# Patient Record
Sex: FEMALE | Race: WHITE | NOT HISPANIC OR LATINO | Employment: FULL TIME | URBAN - METROPOLITAN AREA
[De-identification: names, ages, dates, MRNs, and addresses within clinical notes are randomized per-mention and may not be internally consistent; named-entity substitution may affect disease eponyms.]

---

## 2019-01-08 RX ORDER — CETIRIZINE HYDROCHLORIDE 10 MG/1
10 TABLET ORAL DAILY
COMMUNITY

## 2019-01-08 RX ORDER — IBUPROFEN 800 MG/1
800 TABLET ORAL EVERY 6 HOURS PRN
COMMUNITY
End: 2021-06-01

## 2019-01-08 RX ORDER — ALBUTEROL SULFATE 90 UG/1
2 AEROSOL, METERED RESPIRATORY (INHALATION) EVERY 4 HOURS PRN
COMMUNITY

## 2019-01-08 RX ORDER — ONDANSETRON 4 MG/1
4 TABLET, FILM COATED ORAL EVERY 8 HOURS PRN
COMMUNITY
End: 2020-02-27

## 2019-01-09 ENCOUNTER — OFFICE VISIT (OUTPATIENT)
Dept: NEUROSURGERY | Facility: CLINIC | Age: 44
End: 2019-01-09

## 2019-01-09 VITALS
DIASTOLIC BLOOD PRESSURE: 70 MMHG | HEIGHT: 64 IN | SYSTOLIC BLOOD PRESSURE: 110 MMHG | WEIGHT: 168.8 LBS | BODY MASS INDEX: 28.82 KG/M2 | TEMPERATURE: 97.6 F

## 2019-01-09 DIAGNOSIS — M54.6 PAIN IN THORACIC SPINE: Primary | ICD-10-CM

## 2019-01-09 DIAGNOSIS — R10.13 EPIGASTRIC PAIN: ICD-10-CM

## 2019-01-09 PROCEDURE — 99243 OFF/OP CNSLTJ NEW/EST LOW 30: CPT | Performed by: NEUROLOGICAL SURGERY

## 2019-01-09 RX ORDER — CYCLOBENZAPRINE HCL 10 MG
10 TABLET ORAL 3 TIMES DAILY PRN
COMMUNITY
End: 2021-06-01

## 2019-01-09 RX ORDER — AMITRIPTYLINE HYDROCHLORIDE 25 MG/1
25 TABLET, FILM COATED ORAL NIGHTLY
COMMUNITY
End: 2020-02-27

## 2019-01-09 NOTE — PROGRESS NOTES
Subjective     Chief Complaint: Left-sided thoracic back pain, left chest wall pain, epigastric pain with nausea.    Patient ID: Kamille Beltrán is a 43 y.o. female seen for consultation today at the request of  Tank Anand,*    Back Pain   This is a chronic problem. The current episode started more than 1 year ago. The problem occurs constantly. The pain is present in the thoracic spine. The quality of the pain is described as aching. The pain is at a severity of 8/10. The pain is severe. The pain is the same all the time. The symptoms are aggravated by twisting and sitting. Stiffness is present all day. Associated symptoms include numbness. Pertinent negatives include no abdominal pain, chest pain, dysuria, fever, headaches or weakness. Risk factors include lack of exercise and sedentary lifestyle. She has tried analgesics, chiropractic manipulation, home exercises and NSAIDs for the symptoms. The treatment provided no relief.       This is a 43-year-old woman who presents to my office with a multiyear history of left-sided back pain, left chest wall pain, and epigastric pain with nausea.  She has a history of a remote lumbar spine operation which was performed 22 years ago at [facility].  She has tried physical therapy and chiropractic care which are ineffective at alleviating her pain.  She denies any other neurological symptoms, specifically no gait instability, or bladder/bowel incontinence.  She denies antecedent trauma.    The following portions of the patient's history were reviewed and updated as appropriate: allergies, current medications, past family history, past medical history, past social history, past surgical history and problem list.    Family history:   Family History   Problem Relation Age of Onset   • Cancer Father        Social history:   Social History     Socioeconomic History   • Marital status:      Spouse name: Not on file   • Number of children: Not on file   • Years  of education: Not on file   • Highest education level: Not on file   Social Needs   • Financial resource strain: Not on file   • Food insecurity - worry: Not on file   • Food insecurity - inability: Not on file   • Transportation needs - medical: Not on file   • Transportation needs - non-medical: Not on file   Occupational History   • Not on file   Tobacco Use   • Smoking status: Current Every Day Smoker     Packs/day: 1.00   • Smokeless tobacco: Never Used   Substance and Sexual Activity   • Alcohol use: Yes   • Drug use: No   • Sexual activity: Defer   Other Topics Concern   • Not on file   Social History Narrative   • Not on file       Review of Systems   Constitutional: Positive for activity change, fatigue and unexpected weight change. Negative for appetite change, chills, diaphoresis and fever.   HENT: Positive for congestion. Negative for dental problem, drooling, ear discharge, ear pain, facial swelling, hearing loss, mouth sores, nosebleeds, postnasal drip, rhinorrhea, sinus pressure, sneezing, sore throat, tinnitus, trouble swallowing and voice change.    Eyes: Positive for visual disturbance. Negative for photophobia, pain, discharge, redness and itching.   Respiratory: Negative for apnea, cough, choking, chest tightness, shortness of breath, wheezing and stridor.    Cardiovascular: Negative for chest pain, palpitations and leg swelling.   Gastrointestinal: Negative for abdominal distention, abdominal pain, anal bleeding, blood in stool, constipation, diarrhea, nausea, rectal pain and vomiting.   Endocrine: Negative for cold intolerance, heat intolerance, polydipsia, polyphagia and polyuria.   Genitourinary: Negative for decreased urine volume, difficulty urinating, dysuria, enuresis, flank pain, frequency, genital sores, hematuria and urgency.   Musculoskeletal: Positive for back pain. Negative for arthralgias, gait problem, joint swelling, myalgias, neck pain and neck stiffness.   Skin: Negative for  "color change, pallor, rash and wound.   Allergic/Immunologic: Negative for environmental allergies, food allergies and immunocompromised state.   Neurological: Positive for numbness. Negative for dizziness, tremors, seizures, syncope, facial asymmetry, speech difficulty, weakness, light-headedness and headaches.   Hematological: Negative for adenopathy. Does not bruise/bleed easily.   Psychiatric/Behavioral: Negative for agitation, behavioral problems, confusion, decreased concentration, dysphoric mood, hallucinations, self-injury, sleep disturbance and suicidal ideas. The patient is nervous/anxious. The patient is not hyperactive.    All other systems reviewed and are negative.      Objective   Blood pressure 110/70, temperature 97.6 °F (36.4 °C), temperature source Temporal, height 162.6 cm (64\"), weight 76.6 kg (168 lb 12.8 oz).  Body mass index is 28.97 kg/m².    Physical Exam   Constitutional: She is oriented to person, place, and time. She appears well-developed and well-nourished.  Non-toxic appearance. No distress.   HENT:   Head: Normocephalic and atraumatic.   Right Ear: Hearing normal.   Left Ear: Hearing normal.   Nose: Nose normal.   Mouth/Throat: Oropharynx is clear and moist.   Eyes: Conjunctivae, EOM and lids are normal. Pupils are equal, round, and reactive to light. Right eye exhibits no discharge. Left eye exhibits no discharge.   Neck: Normal range of motion and phonation normal. No JVD present. No tracheal deviation present. No thyromegaly present.   Cardiovascular: Normal rate and regular rhythm.   Pulses:       Radial pulses are 2+ on the right side, and 2+ on the left side.   Pulmonary/Chest: Effort normal. No stridor. No respiratory distress. She has no wheezes.   Abdominal: Soft. Normal appearance. She exhibits no distension. There is no tenderness.   Musculoskeletal: She exhibits no edema.        Thoracic back: She exhibits decreased range of motion and tenderness.   Muscle Group     L     "      R  Deltoid                5          5  Bicep                  5          5  Tricep                 5          5                       5          5  Hand IO              5          5  Hip Flexor           5          5  Knee Extensor   5          5     ADF                    5          5  APF                    5          5      Neurological: She is alert and oriented to person, place, and time. She displays normal reflexes. No cranial nerve deficit or sensory deficit. She exhibits normal muscle tone. She displays a negative Romberg sign. Coordination and gait normal. GCS eye subscore is 4. GCS verbal subscore is 5. GCS motor subscore is 6.   Reflex Scores:       Patellar reflexes are 3+ on the right side and 3+ on the left side.       Achilles reflexes are 3+ on the right side and 3+ on the left side.  No clonus   Skin: Skin is warm and dry. No rash noted. She is not diaphoretic. No erythema.   Psychiatric: She has a normal mood and affect. Her speech is normal and behavior is normal. Judgment and thought content normal.   Nursing note and vitals reviewed.        Assessment/Plan     Independent Review of Radiographic Studies:      Available for my review is a MRI of the thoracic spine which was performed on December 28, 2018.  This demonstrates a small, paracentral disc herniation in the mid thoracic spine, at approximately the T7-8 or T8-9 level.  There is no evidence of significant central canal stenosis or spinal cord impingement.  His CT scan from 7/31/18 is also reviewed.  This demonstrates mild degenerative changes which are diffuse in nature, throughout the thoracic spine.    Medical Decision Making:      This is a 43-year-old woman with a multiyear history of epigastric pain, and left-sided thoracic back pain.  She does not have any radiographic evidence of thoracic nerve root compression.  My recommendation is for physical therapy and pain management for her thoracic back pain.  She might benefit  from a left sided intraforaminal injection in the T7 8 or T8 9.  This could be a useful diagnostic tool to determine if she might benefit from a foraminotomy, however really don't see anything surgical on her MRI.    She has a history of epigastric pain and nausea, so I made a referral to gastroenterology.  She can follow-up with me on an as-needed basis for progressive or refractory symptoms.    Kamille was seen today for back pain.    Diagnoses and all orders for this visit:    Pain in thoracic spine  -     Ambulatory Referral to Pain Management  -     Ambulatory Referral to Physical Therapy Evaluate and treat    Epigastric pain  -     Ambulatory Referral to Gastroenterology        Return if symptoms worsen or fail to improve.           This document signed by SPRING Draper MD January 9, 2019 9:53 AM

## 2019-01-14 ENCOUNTER — TELEPHONE (OUTPATIENT)
Dept: NEUROSURGERY | Facility: CLINIC | Age: 44
End: 2019-01-14

## 2019-01-14 NOTE — TELEPHONE ENCOUNTER
Patient calling was currently at her physical therapy location call was sent directly to me.  Patient needed her physical therapy order faxed to [phone].    Order faxed, encounter closed.

## 2019-02-14 ENCOUNTER — OFFICE VISIT (OUTPATIENT)
Dept: GASTROENTEROLOGY | Facility: CLINIC | Age: 44
End: 2019-02-14

## 2019-02-14 DIAGNOSIS — R10.13 EPIGASTRIC PAIN: Primary | ICD-10-CM

## 2019-02-14 DIAGNOSIS — K21.9 GASTROESOPHAGEAL REFLUX DISEASE WITHOUT ESOPHAGITIS: ICD-10-CM

## 2019-02-14 PROCEDURE — 99204 OFFICE O/P NEW MOD 45 MIN: CPT | Performed by: INTERNAL MEDICINE

## 2019-02-14 NOTE — PROGRESS NOTES
PCP:  Tank Anand MD Newman, Charles Benjamin, MD  [address] [facility]  [address]  [place] [address]    Chief Complaint   Patient presents with   • Abdominal Pain   • Nausea        HPI   The patient comes in with pain.  The pain was in her chest.  She felt like this was musculoskeletal or related to her back and neck.  She apparently had some epigastric pain and nausea as well.  The pain came and went.  Doesn't appear to be related to meals.  She does have some reflux but that isn't a major concern for her.  She has no dysphagia.  When she had the pain was 8/10 in severity.  She was sent for evaluation given her epigastric pain and nausea.   She was referred to evaluate for potential GI source of the pain.    Allergies: No known drug allergies      Current Outpatient Medications:   •  albuterol sulfate HFA (VENTOLIN HFA) 108 (90 Base) MCG/ACT inhaler, Inhale 2 puffs Every 4 (Four) Hours As Needed for Wheezing., Disp: , Rfl:   •  amitriptyline (ELAVIL) 25 MG tablet, Take 25 mg by mouth Every Night., Disp: , Rfl:   •  cetirizine (zyrTEC) 10 MG tablet, Take 10 mg by mouth Daily., Disp: , Rfl:   •  cyclobenzaprine (FLEXERIL) 10 MG tablet, Take 10 mg by mouth 3 (Three) Times a Day As Needed for Muscle Spasms., Disp: , Rfl:   •  Fluticasone Furoate 50 MCG/ACT aerosol powder , Inhale., Disp: , Rfl:   •  ibuprofen (ADVIL,MOTRIN) 800 MG tablet, Take 800 mg by mouth Every 6 (Six) Hours As Needed for Mild Pain ., Disp: , Rfl:   •  ondansetron (ZOFRAN) 4 MG tablet, Take 4 mg by mouth Every 8 (Eight) Hours As Needed for Nausea or Vomiting., Disp: , Rfl:      Past Medical History:   Diagnosis Date   • Back problem        Past Surgical History:   Procedure Laterality Date   • LUMBAR SPINE SURGERY  1997    [facility]   • TUBAL ABDOMINAL LIGATION          Social History     Socioeconomic History   • Marital status:      Spouse name: Not on file   • Number of children: Not on file   • Years of  education: Not on file   • Highest education level: Not on file   Social Needs   • Financial resource strain: Not on file   • Food insecurity - worry: Not on file   • Food insecurity - inability: Not on file   • Transportation needs - medical: Not on file   • Transportation needs - non-medical: Not on file   Occupational History   • Not on file   Tobacco Use   • Smoking status: Current Every Day Smoker     Packs/day: 1.00   • Smokeless tobacco: Never Used   Substance and Sexual Activity   • Alcohol use: No     Frequency: Never   • Drug use: No   • Sexual activity: Defer   Other Topics Concern   • Not on file   Social History Narrative   • Not on file        Family History   Problem Relation Age of Onset   • Cancer Father         Review of Systems   Constitutional: Negative for unexpected weight loss.   HENT: Negative for trouble swallowing.    Eyes: Negative.    Respiratory: Negative.    Gastrointestinal: Positive for abdominal pain, nausea, GERD and indigestion. Negative for abdominal distention, anal bleeding, blood in stool, constipation, diarrhea, rectal pain and vomiting.   Endocrine: Negative.    Genitourinary: Negative.    Musculoskeletal: Negative.    Skin: Negative.    Allergic/Immunologic: Negative.    Neurological: Negative.    Hematological: Negative.    Psychiatric/Behavioral: Negative.         There were no vitals filed for this visit.     Physical Exam   General Appearance: Alert, in no acute distress   Head: Normocephalic, without obvious abnormality, atraumatic   Eyes: Lids and lashes normal, conjunctivae and sclerae normal, no icterus, no pallor, corneas clear, PERRLA   Ears: Ears appear intact with no abnormalities noted   Throat: No oral lesions, no thrush, oral mucosa moist   Neck: No adenopathy, supple, trachea midline, no thyromegaly, no JVD   Lungs: Clear to auscultation,respirations regular, even and unlabored Heart: Regular rhythm and normal rate, normal S1 and S2, no murmur, no gallop, no  rub, no click   Chest Wall: Symmetrical respiratory expansion   Abdomen: Normal bowel sounds, no masses, no organomegaly, soft non-tender, non-distended, no guarding, no rebound tenderness   Extremities: Moves all extremities well, no edema, no cyanosis, no redness   Skin: No bleeding, bruising or rash   Neurologic: Cranial nerves 2 - 12 grossly intact, no focal deficits       Kamille was seen today for abdominal pain and nausea.    Diagnoses and all orders for this visit:    Epigastric pain    Gastroesophageal reflux disease without esophagitis    Other orders  -     omeprazole (priLOSEC) 40 MG capsule; Take 1 capsule by mouth Daily.    Impressions and plan #1 epigastric pain and reflux.  We talked about the options.  Typically, I perform a upper endoscopy to evaluate.  I think its reasonable to consider the Prilosec 40 mg a day for the next 6-8 weeks to see how she does.  If she has continue troubles that I would consider endoscopy.  She has had a colonoscopy on 12/7/17.  This was remarkable for 2 small sessile polyps.  These were tubular adenomas.  A 5 year follow-up was suggested.  See her back in follow-up in 2 months.     Dariel Basilio MD

## 2019-02-15 PROBLEM — R10.13 EPIGASTRIC PAIN: Status: ACTIVE | Noted: 2019-02-15

## 2019-02-15 RX ORDER — OMEPRAZOLE 40 MG/1
40 CAPSULE, DELAYED RELEASE ORAL DAILY
Qty: 90 CAPSULE | Refills: 3 | Status: SHIPPED | OUTPATIENT
Start: 2019-02-15 | End: 2020-02-27

## 2020-02-27 ENCOUNTER — OFFICE VISIT (OUTPATIENT)
Dept: RETAIL CLINIC | Facility: CLINIC | Age: 45
End: 2020-02-27

## 2020-02-27 VITALS
WEIGHT: 173.6 LBS | DIASTOLIC BLOOD PRESSURE: 64 MMHG | HEART RATE: 97 BPM | RESPIRATION RATE: 18 BRPM | SYSTOLIC BLOOD PRESSURE: 116 MMHG | TEMPERATURE: 97.8 F | BODY MASS INDEX: 29.64 KG/M2 | HEIGHT: 64 IN | OXYGEN SATURATION: 98 %

## 2020-02-27 DIAGNOSIS — R06.2 WHEEZES: ICD-10-CM

## 2020-02-27 DIAGNOSIS — J45.40 MODERATE PERSISTENT ASTHMA, UNSPECIFIED WHETHER COMPLICATED: ICD-10-CM

## 2020-02-27 DIAGNOSIS — Z72.0 TOBACCO ABUSE: ICD-10-CM

## 2020-02-27 DIAGNOSIS — J06.9 ACUTE URI: Primary | ICD-10-CM

## 2020-02-27 PROCEDURE — 99213 OFFICE O/P EST LOW 20 MIN: CPT | Performed by: NURSE PRACTITIONER

## 2020-02-27 RX ORDER — ALBUTEROL SULFATE 1.25 MG/3ML
1 SOLUTION RESPIRATORY (INHALATION) EVERY 6 HOURS PRN
Qty: 30 VIAL | Refills: 0 | Status: SHIPPED | OUTPATIENT
Start: 2020-02-27 | End: 2020-03-28

## 2020-02-27 RX ORDER — DOXYCYCLINE HYCLATE 100 MG
100 TABLET ORAL 2 TIMES DAILY
Qty: 20 TABLET | Refills: 0 | Status: SHIPPED | OUTPATIENT
Start: 2020-02-27 | End: 2020-03-08

## 2020-02-27 RX ORDER — ALBUTEROL SULFATE 90 UG/1
2 AEROSOL, METERED RESPIRATORY (INHALATION) EVERY 4 HOURS PRN
Qty: 1 INHALER | Refills: 0 | Status: SHIPPED | OUTPATIENT
Start: 2020-02-27 | End: 2020-03-28

## 2020-02-27 RX ORDER — PREDNISONE 20 MG/1
20 TABLET ORAL DAILY
Qty: 5 TABLET | Refills: 0 | Status: SHIPPED | OUTPATIENT
Start: 2020-02-27 | End: 2020-03-03

## 2020-02-27 NOTE — PROGRESS NOTES
Subjective   Kamille Beltrán is a 44 y.o. female.     Congestion and cough. Today with right sided face pain and ear pain, knot in right side of neck.  Using zyrtec, flonase and singulair.         The following portions of the patient's history were reviewed and updated as appropriate: allergies, current medications, past family history, past medical history, past social history, past surgical history and problem list.    Review of Systems   Constitutional: Positive for appetite change, chills and fatigue. Negative for fever.   HENT: Positive for congestion, ear pain, sinus pain and sore throat.    Respiratory: Positive for cough and wheezing.        Objective   Physical Exam   HENT:   Head: Normocephalic.   Right Ear: Tympanic membrane normal.   Left Ear: Tympanic membrane normal.   Nose: Mucosal edema present.   Mouth/Throat: Posterior oropharyngeal erythema present. Tonsils are 0 on the right. Tonsils are 0 on the left.   Eyes: Pupils are equal, round, and reactive to light.   Neck: Normal range of motion.   Cardiovascular: Normal rate and regular rhythm.   Pulmonary/Chest: Effort normal. She has wheezes.   Lymphadenopathy:     She has cervical adenopathy.        Right cervical: Superficial cervical adenopathy present.       Assessment/Plan   Kamille was seen today for earache and sinusitis.    Diagnoses and all orders for this visit:    Acute URI    Wheezes    Tobacco abuse    Moderate persistent asthma, unspecified whether complicated    Other orders  -     doxycycline (VIBRAMYICN) 100 MG tablet; Take 1 tablet by mouth 2 (Two) Times a Day for 10 days.  -     predniSONE (DELTASONE) 20 MG tablet; Take 1 tablet by mouth Daily for 5 days.  -     albuterol (ACCUNEB) 1.25 MG/3ML nebulizer solution; Take 3 mL by nebulization Every 6 (Six) Hours As Needed for Wheezing for up to 30 days.  -     albuterol sulfate  (90 Base) MCG/ACT inhaler; Inhale 2 puffs Every 4 (Four) Hours As Needed for Wheezing for up to 30  days.

## 2020-07-29 ENCOUNTER — LAB (OUTPATIENT)
Dept: LAB | Facility: HOSPITAL | Age: 45
End: 2020-07-29

## 2020-07-29 ENCOUNTER — TRANSCRIBE ORDERS (OUTPATIENT)
Dept: LAB | Facility: HOSPITAL | Age: 45
End: 2020-07-29

## 2020-07-29 DIAGNOSIS — N92.1 MENORRHAGIA WITH IRREGULAR CYCLE: ICD-10-CM

## 2020-07-29 DIAGNOSIS — N92.1 MENORRHAGIA WITH IRREGULAR CYCLE: Primary | ICD-10-CM

## 2020-07-29 PROCEDURE — 85027 COMPLETE CBC AUTOMATED: CPT | Performed by: NURSE PRACTITIONER

## 2020-07-29 PROCEDURE — 84443 ASSAY THYROID STIM HORMONE: CPT

## 2020-07-29 PROCEDURE — 36415 COLL VENOUS BLD VENIPUNCTURE: CPT | Performed by: NURSE PRACTITIONER

## 2020-07-30 LAB
DEPRECATED RDW RBC AUTO: 45.4 FL (ref 37–54)
ERYTHROCYTE [DISTWIDTH] IN BLOOD BY AUTOMATED COUNT: 13.2 % (ref 12.3–15.4)
HCT VFR BLD AUTO: 33.5 % (ref 34–46.6)
HGB BLD-MCNC: 10.8 G/DL (ref 12–15.9)
MCH RBC QN AUTO: 29.7 PG (ref 26.6–33)
MCHC RBC AUTO-ENTMCNC: 32.2 G/DL (ref 31.5–35.7)
MCV RBC AUTO: 92 FL (ref 79–97)
PLATELET # BLD AUTO: 342 10*3/MM3 (ref 140–450)
PMV BLD AUTO: 9.8 FL (ref 6–12)
RBC # BLD AUTO: 3.64 10*6/MM3 (ref 3.77–5.28)
TSH SERPL DL<=0.05 MIU/L-ACNC: 2.4 UIU/ML (ref 0.27–4.2)
WBC # BLD AUTO: 7.87 10*3/MM3 (ref 3.4–10.8)

## 2021-05-27 ENCOUNTER — PREP FOR SURGERY (OUTPATIENT)
Dept: OTHER | Facility: HOSPITAL | Age: 46
End: 2021-05-27

## 2021-05-27 DIAGNOSIS — N92.4 EXCESSIVE BLEEDING IN PREMENOPAUSAL PERIOD: Primary | ICD-10-CM

## 2021-05-27 PROBLEM — N92.0 MENORRHAGIA: Status: ACTIVE | Noted: 2021-05-27

## 2021-05-27 RX ORDER — CEFAZOLIN SODIUM 2 G/100ML
2 INJECTION, SOLUTION INTRAVENOUS ONCE
Status: CANCELLED | OUTPATIENT
Start: 2021-05-27 | End: 2021-05-27

## 2021-05-27 RX ORDER — HEPARIN SODIUM 5000 [USP'U]/ML
5000 INJECTION, SOLUTION INTRAVENOUS; SUBCUTANEOUS ONCE
Status: CANCELLED | OUTPATIENT
Start: 2021-05-27 | End: 2021-05-27

## 2021-05-27 RX ORDER — GABAPENTIN 300 MG/1
600 CAPSULE ORAL ONCE
Status: CANCELLED | OUTPATIENT
Start: 2021-05-27 | End: 2021-05-27

## 2021-05-27 RX ORDER — ACETAMINOPHEN 500 MG
1000 TABLET ORAL ONCE
Status: CANCELLED | OUTPATIENT
Start: 2021-05-27 | End: 2021-05-27

## 2021-05-27 RX ORDER — SODIUM CHLORIDE 0.9 % (FLUSH) 0.9 %
10 SYRINGE (ML) INJECTION AS NEEDED
Status: CANCELLED | OUTPATIENT
Start: 2021-05-27

## 2021-05-27 RX ORDER — SODIUM CHLORIDE 0.9 % (FLUSH) 0.9 %
3 SYRINGE (ML) INJECTION EVERY 12 HOURS SCHEDULED
Status: CANCELLED | OUTPATIENT
Start: 2021-05-27

## 2021-06-01 ENCOUNTER — PRE-ADMISSION TESTING (OUTPATIENT)
Dept: PREADMISSION TESTING | Facility: HOSPITAL | Age: 46
End: 2021-06-01

## 2021-06-01 ENCOUNTER — APPOINTMENT (OUTPATIENT)
Dept: PREADMISSION TESTING | Facility: HOSPITAL | Age: 46
End: 2021-06-01

## 2021-06-01 VITALS — HEIGHT: 64 IN | BODY MASS INDEX: 31.14 KG/M2 | WEIGHT: 182.4 LBS

## 2021-06-01 DIAGNOSIS — N92.4 EXCESSIVE BLEEDING IN PREMENOPAUSAL PERIOD: ICD-10-CM

## 2021-06-01 LAB
B-HCG UR QL: NEGATIVE
BACTERIA UR QL AUTO: ABNORMAL /HPF
BASOPHILS # BLD AUTO: 0.06 10*3/MM3 (ref 0–0.2)
BASOPHILS NFR BLD AUTO: 0.9 % (ref 0–1.5)
BILIRUB UR QL STRIP: NEGATIVE
CLARITY UR: CLEAR
COLOR UR: YELLOW
DEPRECATED RDW RBC AUTO: 45.3 FL (ref 37–54)
EOSINOPHIL # BLD AUTO: 0.11 10*3/MM3 (ref 0–0.4)
EOSINOPHIL NFR BLD AUTO: 1.7 % (ref 0.3–6.2)
ERYTHROCYTE [DISTWIDTH] IN BLOOD BY AUTOMATED COUNT: 13.9 % (ref 12.3–15.4)
GLUCOSE UR STRIP-MCNC: NEGATIVE MG/DL
HCT VFR BLD AUTO: 41.3 % (ref 34–46.6)
HGB BLD-MCNC: 13.2 G/DL (ref 12–15.9)
HGB UR QL STRIP.AUTO: ABNORMAL
HYALINE CASTS UR QL AUTO: ABNORMAL /LPF
IMM GRANULOCYTES # BLD AUTO: 0.02 10*3/MM3 (ref 0–0.05)
IMM GRANULOCYTES NFR BLD AUTO: 0.3 % (ref 0–0.5)
KETONES UR QL STRIP: NEGATIVE
LEUKOCYTE ESTERASE UR QL STRIP.AUTO: NEGATIVE
LYMPHOCYTES # BLD AUTO: 1.89 10*3/MM3 (ref 0.7–3.1)
LYMPHOCYTES NFR BLD AUTO: 28.4 % (ref 19.6–45.3)
MCH RBC QN AUTO: 28.6 PG (ref 26.6–33)
MCHC RBC AUTO-ENTMCNC: 32 G/DL (ref 31.5–35.7)
MCV RBC AUTO: 89.4 FL (ref 79–97)
MONOCYTES # BLD AUTO: 0.41 10*3/MM3 (ref 0.1–0.9)
MONOCYTES NFR BLD AUTO: 6.2 % (ref 5–12)
NEUTROPHILS NFR BLD AUTO: 4.17 10*3/MM3 (ref 1.7–7)
NEUTROPHILS NFR BLD AUTO: 62.5 % (ref 42.7–76)
NITRITE UR QL STRIP: NEGATIVE
NRBC BLD AUTO-RTO: 0 /100 WBC (ref 0–0.2)
PH UR STRIP.AUTO: 8 [PH] (ref 5–8)
PLATELET # BLD AUTO: 343 10*3/MM3 (ref 140–450)
PMV BLD AUTO: 9 FL (ref 6–12)
PROT UR QL STRIP: NEGATIVE
RBC # BLD AUTO: 4.62 10*6/MM3 (ref 3.77–5.28)
RBC # UR: ABNORMAL /HPF
REF LAB TEST METHOD: ABNORMAL
SARS-COV-2 RNA PNL SPEC NAA+PROBE: NOT DETECTED
SP GR UR STRIP: 1.01 (ref 1–1.03)
SQUAMOUS #/AREA URNS HPF: ABNORMAL /HPF
UROBILINOGEN UR QL STRIP: ABNORMAL
WBC # BLD AUTO: 6.66 10*3/MM3 (ref 3.4–10.8)
WBC UR QL AUTO: ABNORMAL /HPF

## 2021-06-01 PROCEDURE — C9803 HOPD COVID-19 SPEC COLLECT: HCPCS

## 2021-06-01 PROCEDURE — 85025 COMPLETE CBC W/AUTO DIFF WBC: CPT

## 2021-06-01 PROCEDURE — 81025 URINE PREGNANCY TEST: CPT

## 2021-06-01 PROCEDURE — 87086 URINE CULTURE/COLONY COUNT: CPT

## 2021-06-01 PROCEDURE — 36415 COLL VENOUS BLD VENIPUNCTURE: CPT

## 2021-06-01 PROCEDURE — 81001 URINALYSIS AUTO W/SCOPE: CPT

## 2021-06-01 PROCEDURE — U0004 COV-19 TEST NON-CDC HGH THRU: HCPCS

## 2021-06-01 RX ORDER — FLUTICASONE PROPIONATE 50 MCG
2 SPRAY, SUSPENSION (ML) NASAL DAILY
COMMUNITY

## 2021-06-01 RX ORDER — OMEGA-3 FATTY ACIDS/FISH OIL 300-1000MG
200 CAPSULE ORAL AS NEEDED
COMMUNITY
End: 2021-06-03 | Stop reason: HOSPADM

## 2021-06-01 NOTE — PAT
Patient instructed to drink 20 ounces (or until full) of Gatorade and it needs to be completed 1 hour before given arrival time for procedure (NO RED Gatorade)    Patient verbalized understanding.    Instructed patient to take two Tylenol extra strength (total of 1000 mg) the night before surgery.    Patient to apply Chlorhexadine wipes  to surgical area (as instructed) the night before procedure and the AM of procedure. Wipes provided.    An arrival time for procedure was not given during PAT visit. If patient had any questions or concerns about their arrival time, they were instructed to contact their surgeon/physician.  Additionally, if the patient referred to an arrival time that was acquired from their my chart account, patient was encouraged to verify that time with their surgeon/physician.  NO arrival times given in Pre Admission Testing Department.

## 2021-06-02 LAB — BACTERIA SPEC AEROBE CULT: NO GROWTH

## 2021-06-03 ENCOUNTER — HOSPITAL ENCOUNTER (OUTPATIENT)
Facility: HOSPITAL | Age: 46
Discharge: HOME | End: 2021-06-03
Attending: OBSTETRICS & GYNECOLOGY | Admitting: OBSTETRICS & GYNECOLOGY

## 2021-06-03 ENCOUNTER — ANESTHESIA (OUTPATIENT)
Dept: PERIOP | Facility: HOSPITAL | Age: 46
End: 2021-06-03

## 2021-06-03 ENCOUNTER — ANESTHESIA EVENT (OUTPATIENT)
Dept: PERIOP | Facility: HOSPITAL | Age: 46
End: 2021-06-03

## 2021-06-03 VITALS
DIASTOLIC BLOOD PRESSURE: 60 MMHG | HEIGHT: 64 IN | SYSTOLIC BLOOD PRESSURE: 108 MMHG | HEART RATE: 58 BPM | OXYGEN SATURATION: 93 % | BODY MASS INDEX: 31.07 KG/M2 | RESPIRATION RATE: 16 BRPM | TEMPERATURE: 97 F | WEIGHT: 182 LBS

## 2021-06-03 DIAGNOSIS — N93.9 ABNORMAL UTERINE BLEEDING: ICD-10-CM

## 2021-06-03 DIAGNOSIS — N92.4 EXCESSIVE BLEEDING IN PREMENOPAUSAL PERIOD: Primary | ICD-10-CM

## 2021-06-03 LAB
B-HCG UR QL: NEGATIVE
INTERNAL NEGATIVE CONTROL: NEGATIVE
INTERNAL POSITIVE CONTROL: POSITIVE
Lab: NORMAL

## 2021-06-03 PROCEDURE — 25010000002 PROPOFOL 10 MG/ML EMULSION: Performed by: NURSE ANESTHETIST, CERTIFIED REGISTERED

## 2021-06-03 PROCEDURE — 58571 TLH W/T/O 250 G OR LESS: CPT | Performed by: PHYSICIAN ASSISTANT

## 2021-06-03 PROCEDURE — 25010000002 NEOSTIGMINE 10 MG/10ML SOLUTION: Performed by: NURSE ANESTHETIST, CERTIFIED REGISTERED

## 2021-06-03 PROCEDURE — 25010000002 DEXAMETHASONE PER 1 MG: Performed by: NURSE ANESTHETIST, CERTIFIED REGISTERED

## 2021-06-03 PROCEDURE — 25010000003 LIDOCAINE 1 % SOLUTION: Performed by: NURSE ANESTHETIST, CERTIFIED REGISTERED

## 2021-06-03 PROCEDURE — 25010000002 KETOROLAC TROMETHAMINE PER 15 MG: Performed by: NURSE ANESTHETIST, CERTIFIED REGISTERED

## 2021-06-03 PROCEDURE — 25010000003 CEFAZOLIN IN DEXTROSE 2-4 GM/100ML-% SOLUTION: Performed by: OBSTETRICS & GYNECOLOGY

## 2021-06-03 PROCEDURE — 25010000002 ONDANSETRON PER 1 MG: Performed by: NURSE ANESTHETIST, CERTIFIED REGISTERED

## 2021-06-03 PROCEDURE — 88307 TISSUE EXAM BY PATHOLOGIST: CPT | Performed by: OBSTETRICS & GYNECOLOGY

## 2021-06-03 PROCEDURE — 25010000002 FENTANYL CITRATE (PF) 50 MCG/ML SOLUTION: Performed by: NURSE ANESTHETIST, CERTIFIED REGISTERED

## 2021-06-03 PROCEDURE — 25010000002 HEPARIN (PORCINE) PER 1000 UNITS: Performed by: OBSTETRICS & GYNECOLOGY

## 2021-06-03 PROCEDURE — 81025 URINE PREGNANCY TEST: CPT | Performed by: OBSTETRICS & GYNECOLOGY

## 2021-06-03 PROCEDURE — G0378 HOSPITAL OBSERVATION PER HR: HCPCS

## 2021-06-03 PROCEDURE — 25010000002 HYDROMORPHONE PER 4 MG: Performed by: OBSTETRICS & GYNECOLOGY

## 2021-06-03 RX ORDER — LIDOCAINE HYDROCHLORIDE 10 MG/ML
INJECTION, SOLUTION INFILTRATION; PERINEURAL AS NEEDED
Status: DISCONTINUED | OUTPATIENT
Start: 2021-06-03 | End: 2021-06-03 | Stop reason: SURG

## 2021-06-03 RX ORDER — SODIUM CHLORIDE 0.9 % (FLUSH) 0.9 %
10 SYRINGE (ML) INJECTION AS NEEDED
Status: DISCONTINUED | OUTPATIENT
Start: 2021-06-03 | End: 2021-06-03 | Stop reason: HOSPADM

## 2021-06-03 RX ORDER — ONDANSETRON 2 MG/ML
INJECTION INTRAMUSCULAR; INTRAVENOUS AS NEEDED
Status: DISCONTINUED | OUTPATIENT
Start: 2021-06-03 | End: 2021-06-03 | Stop reason: SURG

## 2021-06-03 RX ORDER — OXYCODONE HYDROCHLORIDE AND ACETAMINOPHEN 5; 325 MG/1; MG/1
1 TABLET ORAL ONCE AS NEEDED
Status: DISCONTINUED | OUTPATIENT
Start: 2021-06-03 | End: 2021-06-03 | Stop reason: HOSPADM

## 2021-06-03 RX ORDER — ONDANSETRON 2 MG/ML
4 INJECTION INTRAMUSCULAR; INTRAVENOUS ONCE AS NEEDED
Status: DISCONTINUED | OUTPATIENT
Start: 2021-06-03 | End: 2021-06-03 | Stop reason: HOSPADM

## 2021-06-03 RX ORDER — FENTANYL CITRATE 50 UG/ML
INJECTION, SOLUTION INTRAMUSCULAR; INTRAVENOUS AS NEEDED
Status: DISCONTINUED | OUTPATIENT
Start: 2021-06-03 | End: 2021-06-03 | Stop reason: SURG

## 2021-06-03 RX ORDER — HEPARIN SODIUM 5000 [USP'U]/ML
INJECTION, SOLUTION INTRAVENOUS; SUBCUTANEOUS AS NEEDED
Status: DISCONTINUED | OUTPATIENT
Start: 2021-06-03 | End: 2021-06-03 | Stop reason: HOSPADM

## 2021-06-03 RX ORDER — CEFAZOLIN SODIUM 2 G/100ML
2 INJECTION, SOLUTION INTRAVENOUS ONCE
Status: COMPLETED | OUTPATIENT
Start: 2021-06-03 | End: 2021-06-03

## 2021-06-03 RX ORDER — PROMETHAZINE HYDROCHLORIDE 25 MG/1
25 TABLET ORAL ONCE AS NEEDED
Status: CANCELLED | OUTPATIENT
Start: 2021-06-03

## 2021-06-03 RX ORDER — GLYCOPYRROLATE 0.2 MG/ML
INJECTION INTRAMUSCULAR; INTRAVENOUS AS NEEDED
Status: DISCONTINUED | OUTPATIENT
Start: 2021-06-03 | End: 2021-06-03 | Stop reason: SURG

## 2021-06-03 RX ORDER — DEXAMETHASONE SODIUM PHOSPHATE 4 MG/ML
INJECTION, SOLUTION INTRA-ARTICULAR; INTRALESIONAL; INTRAMUSCULAR; INTRAVENOUS; SOFT TISSUE AS NEEDED
Status: DISCONTINUED | OUTPATIENT
Start: 2021-06-03 | End: 2021-06-03 | Stop reason: SURG

## 2021-06-03 RX ORDER — HYDROMORPHONE HYDROCHLORIDE 1 MG/ML
0.5 INJECTION, SOLUTION INTRAMUSCULAR; INTRAVENOUS; SUBCUTANEOUS
Status: CANCELLED | OUTPATIENT
Start: 2021-06-03

## 2021-06-03 RX ORDER — LIDOCAINE HYDROCHLORIDE 20 MG/ML
JELLY TOPICAL AS NEEDED
Status: DISCONTINUED | OUTPATIENT
Start: 2021-06-03 | End: 2021-06-03 | Stop reason: SURG

## 2021-06-03 RX ORDER — LIDOCAINE HYDROCHLORIDE 10 MG/ML
0.5 INJECTION, SOLUTION EPIDURAL; INFILTRATION; INTRACAUDAL; PERINEURAL ONCE AS NEEDED
Status: COMPLETED | OUTPATIENT
Start: 2021-06-03 | End: 2021-06-03

## 2021-06-03 RX ORDER — ACETAMINOPHEN 500 MG
1000 TABLET ORAL ONCE
Status: COMPLETED | OUTPATIENT
Start: 2021-06-03 | End: 2021-06-03

## 2021-06-03 RX ORDER — GABAPENTIN 300 MG/1
600 CAPSULE ORAL ONCE
Status: COMPLETED | OUTPATIENT
Start: 2021-06-03 | End: 2021-06-03

## 2021-06-03 RX ORDER — SODIUM CHLORIDE, SODIUM LACTATE, POTASSIUM CHLORIDE, CALCIUM CHLORIDE 600; 310; 30; 20 MG/100ML; MG/100ML; MG/100ML; MG/100ML
9 INJECTION, SOLUTION INTRAVENOUS CONTINUOUS
Status: DISCONTINUED | OUTPATIENT
Start: 2021-06-03 | End: 2021-06-03 | Stop reason: HOSPADM

## 2021-06-03 RX ORDER — SODIUM CHLORIDE 0.9 % (FLUSH) 0.9 %
10 SYRINGE (ML) INJECTION EVERY 12 HOURS SCHEDULED
Status: DISCONTINUED | OUTPATIENT
Start: 2021-06-03 | End: 2021-06-03 | Stop reason: HOSPADM

## 2021-06-03 RX ORDER — BUPIVACAINE HYDROCHLORIDE AND EPINEPHRINE 5; 5 MG/ML; UG/ML
INJECTION, SOLUTION PERINEURAL AS NEEDED
Status: DISCONTINUED | OUTPATIENT
Start: 2021-06-03 | End: 2021-06-03 | Stop reason: HOSPADM

## 2021-06-03 RX ORDER — FAMOTIDINE 10 MG/ML
20 INJECTION, SOLUTION INTRAVENOUS ONCE
Status: DISCONTINUED | OUTPATIENT
Start: 2021-06-03 | End: 2021-06-03

## 2021-06-03 RX ORDER — KETOROLAC TROMETHAMINE 30 MG/ML
INJECTION, SOLUTION INTRAMUSCULAR; INTRAVENOUS AS NEEDED
Status: DISCONTINUED | OUTPATIENT
Start: 2021-06-03 | End: 2021-06-03 | Stop reason: SURG

## 2021-06-03 RX ORDER — MELOXICAM 7.5 MG/1
7.5 TABLET ORAL DAILY
Qty: 20 TABLET | Refills: 0 | Status: SHIPPED | OUTPATIENT
Start: 2021-06-03

## 2021-06-03 RX ORDER — FAMOTIDINE 20 MG/1
20 TABLET, FILM COATED ORAL ONCE
Status: COMPLETED | OUTPATIENT
Start: 2021-06-03 | End: 2021-06-03

## 2021-06-03 RX ORDER — ACETAMINOPHEN 325 MG/1
650 TABLET ORAL EVERY 4 HOURS PRN
Qty: 100 TABLET | Refills: 0 | Status: SHIPPED | OUTPATIENT
Start: 2021-06-03

## 2021-06-03 RX ORDER — ACETAMINOPHEN 325 MG/1
650 TABLET ORAL ONCE
Status: DISCONTINUED | OUTPATIENT
Start: 2021-06-03 | End: 2021-06-03 | Stop reason: HOSPADM

## 2021-06-03 RX ORDER — HYDROMORPHONE HYDROCHLORIDE 1 MG/ML
0.5 INJECTION, SOLUTION INTRAMUSCULAR; INTRAVENOUS; SUBCUTANEOUS
Status: DISCONTINUED | OUTPATIENT
Start: 2021-06-03 | End: 2021-06-03 | Stop reason: HOSPADM

## 2021-06-03 RX ORDER — PROMETHAZINE HYDROCHLORIDE 25 MG/1
25 SUPPOSITORY RECTAL ONCE AS NEEDED
Status: CANCELLED | OUTPATIENT
Start: 2021-06-03

## 2021-06-03 RX ORDER — MAGNESIUM HYDROXIDE 1200 MG/15ML
LIQUID ORAL AS NEEDED
Status: DISCONTINUED | OUTPATIENT
Start: 2021-06-03 | End: 2021-06-03 | Stop reason: HOSPADM

## 2021-06-03 RX ORDER — ONDANSETRON 2 MG/ML
4 INJECTION INTRAMUSCULAR; INTRAVENOUS ONCE AS NEEDED
Status: CANCELLED | OUTPATIENT
Start: 2021-06-03

## 2021-06-03 RX ORDER — OXYCODONE HYDROCHLORIDE 5 MG/1
5 TABLET ORAL EVERY 4 HOURS PRN
Qty: 10 TABLET | Refills: 0 | Status: SHIPPED | OUTPATIENT
Start: 2021-06-03

## 2021-06-03 RX ORDER — MELOXICAM 7.5 MG/1
7.5 TABLET ORAL ONCE AS NEEDED
Status: DISCONTINUED | OUTPATIENT
Start: 2021-06-03 | End: 2021-06-03 | Stop reason: HOSPADM

## 2021-06-03 RX ORDER — NEOSTIGMINE METHYLSULFATE 1 MG/ML
INJECTION, SOLUTION INTRAVENOUS AS NEEDED
Status: DISCONTINUED | OUTPATIENT
Start: 2021-06-03 | End: 2021-06-03 | Stop reason: SURG

## 2021-06-03 RX ORDER — FENTANYL CITRATE 50 UG/ML
50 INJECTION, SOLUTION INTRAMUSCULAR; INTRAVENOUS
Status: CANCELLED | OUTPATIENT
Start: 2021-06-03

## 2021-06-03 RX ORDER — MIDAZOLAM HYDROCHLORIDE 1 MG/ML
1 INJECTION INTRAMUSCULAR; INTRAVENOUS
Status: DISCONTINUED | OUTPATIENT
Start: 2021-06-03 | End: 2021-06-03 | Stop reason: HOSPADM

## 2021-06-03 RX ORDER — HEPARIN SODIUM 5000 [USP'U]/ML
5000 INJECTION, SOLUTION INTRAVENOUS; SUBCUTANEOUS ONCE
Status: DISCONTINUED | OUTPATIENT
Start: 2021-06-03 | End: 2021-06-03 | Stop reason: HOSPADM

## 2021-06-03 RX ORDER — SODIUM CHLORIDE 0.9 % (FLUSH) 0.9 %
3 SYRINGE (ML) INJECTION EVERY 12 HOURS SCHEDULED
Status: DISCONTINUED | OUTPATIENT
Start: 2021-06-03 | End: 2021-06-03 | Stop reason: HOSPADM

## 2021-06-03 RX ORDER — ROCURONIUM BROMIDE 10 MG/ML
INJECTION, SOLUTION INTRAVENOUS AS NEEDED
Status: DISCONTINUED | OUTPATIENT
Start: 2021-06-03 | End: 2021-06-03 | Stop reason: SURG

## 2021-06-03 RX ORDER — PROPOFOL 10 MG/ML
VIAL (ML) INTRAVENOUS AS NEEDED
Status: DISCONTINUED | OUTPATIENT
Start: 2021-06-03 | End: 2021-06-03 | Stop reason: SURG

## 2021-06-03 RX ORDER — POLYETHYLENE GLYCOL 3350 17 G/17G
17 POWDER, FOR SOLUTION ORAL DAILY
Qty: 225 G | Refills: 0 | Status: SHIPPED | OUTPATIENT
Start: 2021-06-03

## 2021-06-03 RX ORDER — SODIUM CHLORIDE 9 MG/ML
INJECTION, SOLUTION INTRAVENOUS AS NEEDED
Status: DISCONTINUED | OUTPATIENT
Start: 2021-06-03 | End: 2021-06-03 | Stop reason: HOSPADM

## 2021-06-03 RX ADMIN — HYDROMORPHONE HYDROCHLORIDE 0.5 MG: 1 INJECTION, SOLUTION INTRAMUSCULAR; INTRAVENOUS; SUBCUTANEOUS at 09:36

## 2021-06-03 RX ADMIN — GABAPENTIN 600 MG: 300 CAPSULE ORAL at 06:44

## 2021-06-03 RX ADMIN — FENTANYL CITRATE 100 MCG: 50 INJECTION, SOLUTION INTRAMUSCULAR; INTRAVENOUS at 07:39

## 2021-06-03 RX ADMIN — FAMOTIDINE 20 MG: 20 TABLET ORAL at 07:31

## 2021-06-03 RX ADMIN — KETOROLAC TROMETHAMINE 30 MG: 30 INJECTION, SOLUTION INTRAMUSCULAR; INTRAVENOUS at 08:51

## 2021-06-03 RX ADMIN — SODIUM CHLORIDE, POTASSIUM CHLORIDE, SODIUM LACTATE AND CALCIUM CHLORIDE: 600; 310; 30; 20 INJECTION, SOLUTION INTRAVENOUS at 08:36

## 2021-06-03 RX ADMIN — SODIUM CHLORIDE, POTASSIUM CHLORIDE, SODIUM LACTATE AND CALCIUM CHLORIDE 9 ML/HR: 600; 310; 30; 20 INJECTION, SOLUTION INTRAVENOUS at 06:42

## 2021-06-03 RX ADMIN — OXYCODONE HYDROCHLORIDE AND ACETAMINOPHEN 1 TABLET: 5; 325 TABLET ORAL at 09:52

## 2021-06-03 RX ADMIN — ROCURONIUM BROMIDE 10 MG: 10 INJECTION, SOLUTION INTRAVENOUS at 08:16

## 2021-06-03 RX ADMIN — LIDOCAINE HYDROCHLORIDE 50 MG: 10 INJECTION, SOLUTION INFILTRATION; PERINEURAL at 07:39

## 2021-06-03 RX ADMIN — ROCURONIUM BROMIDE 30 MG: 10 INJECTION, SOLUTION INTRAVENOUS at 07:39

## 2021-06-03 RX ADMIN — PROPOFOL 25 MG: 10 INJECTION, EMULSION INTRAVENOUS at 07:41

## 2021-06-03 RX ADMIN — PROPOFOL 170 MG: 10 INJECTION, EMULSION INTRAVENOUS at 07:39

## 2021-06-03 RX ADMIN — GLYCOPYRROLATE 0.2 MG: 0.4 INJECTION INTRAMUSCULAR; INTRAVENOUS at 08:04

## 2021-06-03 RX ADMIN — ONDANSETRON 4 MG: 2 INJECTION INTRAMUSCULAR; INTRAVENOUS at 08:25

## 2021-06-03 RX ADMIN — DEXAMETHASONE SODIUM PHOSPHATE 8 MG: 4 INJECTION, SOLUTION INTRA-ARTICULAR; INTRALESIONAL; INTRAMUSCULAR; INTRAVENOUS; SOFT TISSUE at 07:47

## 2021-06-03 RX ADMIN — LIDOCAINE HYDROCHLORIDE 0.2 ML: 10 INJECTION, SOLUTION EPIDURAL; INFILTRATION; INTRACAUDAL; PERINEURAL at 06:42

## 2021-06-03 RX ADMIN — NEOSTIGMINE 4 MG: 1 INJECTION INTRAVENOUS at 08:36

## 2021-06-03 RX ADMIN — GLYCOPYRROLATE 0.6 MG: 0.4 INJECTION INTRAMUSCULAR; INTRAVENOUS at 08:36

## 2021-06-03 RX ADMIN — ACETAMINOPHEN 1000 MG: 500 TABLET ORAL at 06:44

## 2021-06-03 RX ADMIN — LIDOCAINE HYDROCHLORIDE 3 ML: 20 JELLY TOPICAL at 07:41

## 2021-06-03 RX ADMIN — CEFAZOLIN SODIUM 2 G: 10 INJECTION, POWDER, FOR SOLUTION INTRAVENOUS at 07:32

## 2021-06-03 NOTE — ANESTHESIA PROCEDURE NOTES
Airway  Urgency: elective    Date/Time: 6/3/2021 7:41 AM  Airway not difficult    General Information and Staff    Patient location during procedure: OR  CRNA: Monie Sun CRNA    Indications and Patient Condition  Indications for airway management: airway protection    Preoxygenated: yes  MILS not maintained throughout  Mask difficulty assessment: 1 - vent by mask    Final Airway Details  Final airway type: endotracheal airway      Successful airway: ETT  Cuffed: yes   Successful intubation technique: direct laryngoscopy  Endotracheal tube insertion site: oral  Blade: Jelena  Blade size: 3  ETT size (mm): 7.0  Cormack-Lehane Classification: grade I - full view of glottis  Placement verified by: chest auscultation and capnometry   Measured from: lips  ETT/EBT  to lips (cm): 20  Number of attempts at approach: 1  Assessment: lips, teeth, and gum same as pre-op and atraumatic intubation    Additional Comments  Symmetric chest rise and fall. +ETCO2 +BBS.

## 2021-06-03 NOTE — OP NOTE
Hysterectomy Procedure Note      Pre-operative Diagnosis: Menorrhagia    Post-operative Diagnosis: Menorrhagia    Operation: Robotic Hysterectomy with bilateral salpingectomy    Surgeon: Kenia Terrell MD     Assistants:  Assistant: Jonny Davenport PA-C was responsible for performing the following activities: Retraction, Suction, Irrigation, Suturing, Closing and Placing Dressing and their skilled assistance was necessary for the success of this case.       Anesthesia: General endotracheal anesthesia    Findings: Normal pelvis    Estimated Blood Loss:  Minimal, less than 25mL    Specimens: Uterus with cervix and tubes    Complications:  None    Disposition: PACU - hemodynamically stable.      Procedure Details    The patient was seen in the Holding Room. The risks, benefits, complications, treatment options, and expected outcomes were discussed with the patient. The patient concurred with the proposed plan, giving informed consent. The patient was identified as Kamille eBltrán and the procedure verified as robotic hysterectomy with bilateral salpingectomy. A Time Out was held and the above information confirmed.    After induction of anesthesia, the patient was prepped and draped in the usual sterile manner. The uterus was sounded and fit with a Jennifer-Koh intrauterine manipulator.    The operators gloves were changed.  The supraumbilical area was injected with a dilute marcaine solution.  An 8mm supraumbilical incision was made. Intraperitoneal access was gained with the optical noncutting trocar under direct visualization. CO2 was used to insufflate the abdominopelvic cavity to a pressure of approximately 15 mm Hg. She was placed in maximum Trendelenburg. One additional 8mm port site was placed on the left and two additional 8mm ports sites were placed on the right.     The A2Binci system was docked and the procedure continued at the console. The anatomy was inspected. The ureters were identified  bilaterally. The fallopian tube was dissected away from the ovary and the broad ligament with sharp and monopolar dissection.The left uteroovarian ligament was fulguration and transected. The remainder of the broad ligament, down to and including the round ligament was fulguration and transected. The bladder was dissected across the midline with monopolar, sharp and blunt dissection. The procedure was repeated on the right side. The uterine vessels on the left and right were isolated, fulgurated and transected. A circumferential incision was made along the cervicovaginal junction and the uterus was removed from the vagina. The vaginal cuff was then closed with 2-0 vicryl and 0 vicryl. There was good hemostasis at low pressure.     The DaVinci system was undocked and the procedure was completed at the bedside. The trocars were removed. The skin incisions were closed with 3-0 plain and reinforced with skin glue. The counts were correct x 2. Patient was awakened and taken to recover in stable condition.       Kenia Terrell MD  06/03/21  08:43 EDT

## 2021-06-03 NOTE — ANESTHESIA PREPROCEDURE EVALUATION
Anesthesia Evaluation     Patient summary reviewed and Nursing notes reviewed   NPO Solid Status: > 8 hours  NPO Liquid Status: > 8 hours           Airway   Mallampati: I  TM distance: >3 FB  No difficulty expected  Dental    (+) edentulous    Pulmonary     breath sounds clear to auscultation  Cardiovascular   Exercise tolerance: good (4-7 METS)    Rhythm: regular  Rate: normal        Neuro/Psych  GI/Hepatic/Renal/Endo      Musculoskeletal     Abdominal    Substance History      OB/GYN          Other                        Anesthesia Plan    ASA 2     general     intravenous induction     Anesthetic plan, all risks, benefits, and alternatives have been provided, discussed and informed consent has been obtained with: patient.

## 2021-06-03 NOTE — ANESTHESIA POSTPROCEDURE EVALUATION
Patient: Kamille Beltrán    Procedure Summary     Date: 06/03/21 Room / Location:  [facility] OR 18 /  [facility] OR    Anesthesia Start: 0732 Anesthesia Stop: 0852    Procedure: TOTAL ROBOTIC HYSTERECTOMY, BILATERAL SALPINGECTOMY (N/A Abdomen) Diagnosis:     Surgeons: Kenia Terrell MD Provider: Deshawn Cabrera MD    Anesthesia Type: general ASA Status: 2          Anesthesia Type: general    Vitals  Vitals Value Taken Time   /74 06/03/21 0851   Temp 97 °F (36.1 °C) 06/03/21 0851   Pulse 60 06/03/21 0851   Resp 18 06/03/21 0851   SpO2 97 % 06/03/21 0851           Post Anesthesia Care and Evaluation    Patient location during evaluation: PACU  Patient participation: complete - patient participated  Level of consciousness: awake and alert  Pain management: adequate  Airway patency: patent  Anesthetic complications: No anesthetic complications  PONV Status: none  Cardiovascular status: hemodynamically stable and acceptable  Respiratory status: nonlabored ventilation, acceptable and nasal cannula  Hydration status: acceptable

## 2021-06-03 NOTE — INTERVAL H&P NOTE
"  H&P reviewed. The patient was examined and there are no changes to the H&P.      /62 (BP Location: Right arm, Patient Position: Lying)   Pulse 61   Temp 97.7 °F (36.5 °C)   Resp 18   Ht 162.6 cm (64\")   Wt 82.6 kg (182 lb)   LMP 04/10/2021 (Approximate)   SpO2 97%   BMI 31.24 kg/m²   No Known Allergies  No known allergy to latex or contrast dyes.  Social history: tobacco 1 ppd x 30 years, alcohol negative   Immunizations: pneumo negative, flu negative, tetanus up to date  Heart regular rate and rhythm, no murmurs  Lungs clear bilaterally, no wheezing    Impression: abnormal uterine bleeding  Plan: total robotic hysterectomy, possible right salpingoophorectomy     Josey Diamond PA-C  06/03/2021 7:07 EDT  "

## 2021-06-04 LAB
CYTO UR: NORMAL
LAB AP CASE REPORT: NORMAL
LAB AP CLINICAL INFORMATION: NORMAL
LAB AP DIAGNOSIS COMMENT: NORMAL
PATH REPORT.FINAL DX SPEC: NORMAL
PATH REPORT.GROSS SPEC: NORMAL

## (undated) DEVICE — ANTIBACTERIAL UNDYED BRAIDED (POLYGLACTIN 910), SYNTHETIC ABSORBABLE SUTURE: Brand: COATED VICRYL

## (undated) DEVICE — SNAP KOVER: Brand: UNBRANDED

## (undated) DEVICE — BLADELESS OBTURATOR: Brand: WECK VISTA

## (undated) DEVICE — CANNULA SEAL

## (undated) DEVICE — ANTIBACTERIAL UNDYED BRAIDED (POLYGLACTIN 910), SYNTHETIC ABSORBABLE SURGICAL SUTURE: Brand: COATED VICRYL

## (undated) DEVICE — SUT MNCRYL PLS ANTIB UD 3/0 PS2 27IN

## (undated) DEVICE — COLUMN DRAPE

## (undated) DEVICE — GLV SURG DERMASSURE GRN LF PF 7.0

## (undated) DEVICE — ST TBG CONN PNEUMOCLEAR EVAC SMOKE HEAT/HUMID

## (undated) DEVICE — DRP ADAPT ALLY UTER POSTN SYS 1P/U

## (undated) DEVICE — ARM DRAPE

## (undated) DEVICE — BLANKT WARM UPPR/BDY ARM/OUT 57X196CM

## (undated) DEVICE — MANIP UTER RUMI TP 6.7MMX8CM BLU

## (undated) DEVICE — KT POSTN TRENDELENBURG NBXL PAD WING STRAP TABL HDRST XLG

## (undated) DEVICE — DRAPE,TOP,102X53,STERILE: Brand: MEDLINE

## (undated) DEVICE — CVR HNDL LIGHT RIGID

## (undated) DEVICE — MANIP UTER RUMI 2 KOH EFFICIENT 3CM BL

## (undated) DEVICE — ADHS SKIN PREMIERPRO EXOFIN TOPICAL HI/VISC .5ML

## (undated) DEVICE — TIP COVER ACCESSORY

## (undated) DEVICE — PK MAJ GYN DAVINCI 10

## (undated) DEVICE — SUT VIC 2/0 CT2 27IN J269H

## (undated) DEVICE — PATIENT RETURN ELECTRODE, SINGLE-USE, CONTACT QUALITY MONITORING, ADULT, WITH 9FT CORD, FOR PATIENTS WEIGING OVER 33LBS. (15KG): Brand: MEGADYNE

## (undated) DEVICE — APPL CHLORAPREP TINTED 26ML TEAL